# Patient Record
(demographics unavailable — no encounter records)

---

## 2025-04-01 NOTE — DISCUSSION/SUMMARY
[FreeTextEntry1] : I sat down with the patient to discuss the imaging findings & her symptoms which warrant surgical intervention. I explained that this type of ovarian cyst will not resolve without surgical intervention. Reviewed management options for ovarian teratoma including observation, and surgery.  We discussed  cystectomy. She understands that there is rare risk of malignancy.  She would like to proceed with cystectomy via a minimally invasive approach.  The options for surgical approach including open, and laparoscopic with or without robotic assistance were discussed and the patient agrees with plan for laparoscopic procedure.  The differential diagnosis was discussed in detail. The indications, risks, benefits and alternatives were discussed. Including but not limited to, conversion to laparotomy, bleeding, infection, injury to surrounding organs was discussed at length.  Chance of occult injury and need for future surgery.  Tammy Baum and her mother expressed an understanding of the treatment rationale and her questions were answered to her apparent satisfaction.  All questions and concerns addressed, patient expressed understanding.  She was given written postoperative instructions and diagram of the pelvic with surrounding anatomy.   -we reviewed risk of ovarian torsion -reviewed non hormonal cysts -OCP can decrease chance of torsion by eliminating other cyst and dereasing overall volume.  -schedule surgery

## 2025-04-01 NOTE — HISTORY OF PRESENT ILLNESS
[Patient reported PAP Smear was normal] : Patient reported PAP Smear was normal [Y] : Patient reports abnormal menses [N] : Patient denies prior pregnancies [Never active] : never active [PapSmeardate] : 07/2024 [LMPDate] : 03/25/25 [MensesLength] : 5 [FreeTextEntry1] : 03/25/25

## 2025-04-01 NOTE — REASON FOR VISIT
[Consultation] : consultation for [Parent] : parent [FreeTextEntry2] : fibrothecoma [FreeTextEntry1] : Mari Merchant MD

## 2025-04-01 NOTE — CONSULT LETTER
[Dear  ___] : Dear  [unfilled], [Consult Letter:] : I had the pleasure of evaluating your patient, [unfilled]. [( Thank you for referring [unfilled] for consultation for _____ )] : Thank you for referring [unfilled] for consultation for [unfilled] [Please see my note below.] : Please see my note below. [Consult Closing:] : Thank you very much for allowing me to participate in the care of this patient.  If you have any questions, please do not hesitate to contact me. [Sincerely,] : Sincerely, [FreeTextEntry2] : Dr. Mari Merchant  [address] [facility] [address]  [place], [place] [address]   [FreeTextEntry3] : Demarco Kyle MD, FACOG, FACS  Minimally Invasive Gynecologic Surgery  [address] [address]  Tel: [phone]  Fax: [phone]